# Patient Record
Sex: FEMALE | Race: WHITE | NOT HISPANIC OR LATINO
[De-identification: names, ages, dates, MRNs, and addresses within clinical notes are randomized per-mention and may not be internally consistent; named-entity substitution may affect disease eponyms.]

---

## 2021-04-27 ENCOUNTER — APPOINTMENT (OUTPATIENT)
Dept: DISASTER EMERGENCY | Facility: CLINIC | Age: 71
End: 2021-04-27

## 2021-04-27 DIAGNOSIS — Z01.818 ENCOUNTER FOR OTHER PREPROCEDURAL EXAMINATION: ICD-10-CM

## 2021-04-28 LAB — SARS-COV-2 N GENE NPH QL NAA+PROBE: NOT DETECTED

## 2021-04-30 ENCOUNTER — RESULT REVIEW (OUTPATIENT)
Age: 71
End: 2021-04-30

## 2021-04-30 ENCOUNTER — OUTPATIENT (OUTPATIENT)
Dept: OUTPATIENT SERVICES | Facility: HOSPITAL | Age: 71
LOS: 1 days | End: 2021-04-30
Payer: MEDICARE

## 2021-04-30 VITALS
HEIGHT: 60 IN | HEART RATE: 70 BPM | SYSTOLIC BLOOD PRESSURE: 131 MMHG | OXYGEN SATURATION: 97 % | DIASTOLIC BLOOD PRESSURE: 79 MMHG | WEIGHT: 156.09 LBS | RESPIRATION RATE: 20 BRPM | TEMPERATURE: 98 F

## 2021-04-30 VITALS
DIASTOLIC BLOOD PRESSURE: 63 MMHG | SYSTOLIC BLOOD PRESSURE: 110 MMHG | HEART RATE: 67 BPM | OXYGEN SATURATION: 95 % | RESPIRATION RATE: 16 BRPM

## 2021-04-30 DIAGNOSIS — Z98.890 OTHER SPECIFIED POSTPROCEDURAL STATES: Chronic | ICD-10-CM

## 2021-04-30 DIAGNOSIS — K86.2 CYST OF PANCREAS: ICD-10-CM

## 2021-04-30 PROCEDURE — 43242 EGD US FINE NEEDLE BX/ASPIR: CPT

## 2021-04-30 PROCEDURE — 43239 EGD BIOPSY SINGLE/MULTIPLE: CPT | Mod: XS

## 2021-04-30 PROCEDURE — 88305 TISSUE EXAM BY PATHOLOGIST: CPT

## 2021-04-30 PROCEDURE — 88173 CYTOPATH EVAL FNA REPORT: CPT

## 2021-04-30 PROCEDURE — 88305 TISSUE EXAM BY PATHOLOGIST: CPT | Mod: 26

## 2021-04-30 PROCEDURE — 82378 CARCINOEMBRYONIC ANTIGEN: CPT

## 2021-04-30 PROCEDURE — 88173 CYTOPATH EVAL FNA REPORT: CPT | Mod: 26

## 2021-04-30 RX ORDER — CIPROFLOXACIN LACTATE 400MG/40ML
VIAL (ML) INTRAVENOUS
Refills: 0 | Status: DISCONTINUED | OUTPATIENT
Start: 2021-04-30 | End: 2021-05-15

## 2021-04-30 RX ORDER — CIPROFLOXACIN LACTATE 400MG/40ML
400 VIAL (ML) INTRAVENOUS EVERY 12 HOURS
Refills: 0 | Status: DISCONTINUED | OUTPATIENT
Start: 2021-05-01 | End: 2021-05-15

## 2021-04-30 RX ORDER — SERTRALINE 25 MG/1
1 TABLET, FILM COATED ORAL
Qty: 0 | Refills: 0 | DISCHARGE

## 2021-04-30 RX ORDER — OMEPRAZOLE 10 MG/1
1 CAPSULE, DELAYED RELEASE ORAL
Qty: 0 | Refills: 0 | DISCHARGE

## 2021-04-30 RX ORDER — ATORVASTATIN CALCIUM 80 MG/1
1 TABLET, FILM COATED ORAL
Qty: 0 | Refills: 0 | DISCHARGE

## 2021-04-30 RX ORDER — CIPROFLOXACIN LACTATE 400MG/40ML
400 VIAL (ML) INTRAVENOUS ONCE
Refills: 0 | Status: DISCONTINUED | OUTPATIENT
Start: 2021-04-30 | End: 2021-05-15

## 2021-04-30 RX ORDER — SODIUM CHLORIDE 9 MG/ML
500 INJECTION INTRAMUSCULAR; INTRAVENOUS; SUBCUTANEOUS
Refills: 0 | Status: DISCONTINUED | OUTPATIENT
Start: 2021-04-30 | End: 2021-05-15

## 2021-04-30 NOTE — ASU PATIENT PROFILE, ADULT - PRO MENTAL HEALTH SX RECENT
----- Message from Zeferino Archer sent at 12/13/2017  8:46 AM EST -----  Regarding: Lolita Stew: [phone]  Mom called to provide an update.  Please advise [phone] none

## 2021-04-30 NOTE — PRE PROCEDURE NOTE - PRE PROCEDURE EVALUATION
Attending Physician:    Jose Luis Dwyer MD        Procedure: EUS    Indication for Procedure: pancreatic cyst  ________________________________________________________  PAST MEDICAL & SURGICAL HISTORY:  Hypercholesteremia    Gastric ulcer    Anxiety    Arthritis    H/O endoscopy    H/O colonoscopy      ALLERGIES:  Biaxin (Rash)    HOME MEDICATIONS:  atorvastatin 10 mg oral tablet: 1 tab(s) orally once a day  omeprazole 20 mg oral delayed release capsule: 1 cap(s) orally once a day  sertraline 100 mg oral tablet: 1 tab(s) orally once a day    AICD/PPM: [ ] yes   [ X] no    PERTINENT LAB DATA:                      PHYSICAL EXAMINATION:    Height (cm): 152.4  Weight (kg): 70.8  BMI (kg/m2): 30.5  BSA (m2): 1.68T(C): 36.7  HR: 70  BP: 131/79  RR: 20  SpO2: 97%    Constitutional: NAD  HEENT: PERRLA, EOMI,    Neck:  No JVD  Respiratory: CTAB/L  Cardiovascular: S1 and S2  Gastrointestinal: BS+, soft, NT/ND  Extremities: No peripheral edema  Neurological: A/O x 3, no focal deficits  Psychiatric: Normal mood, normal affect  Skin: No rashes    ASA Class: I [ ]  II [ X]  III [ ]  IV [ ]    COMMENTS:    The patient is a suitable candidate for the planned procedure unless box checked [ ]  No, explain:

## 2021-05-03 LAB
CEA FLD-MCNC: 6346 NG/ML — SIGNIFICANT CHANGE UP
NON-GYNECOLOGICAL CYTOLOGY STUDY: SIGNIFICANT CHANGE UP
SPECIMEN SOURCE FLD: SIGNIFICANT CHANGE UP

## 2021-05-04 LAB — SURGICAL PATHOLOGY STUDY: SIGNIFICANT CHANGE UP

## 2021-05-18 ENCOUNTER — TRANSCRIPTION ENCOUNTER (OUTPATIENT)
Age: 71
End: 2021-05-18

## 2025-07-10 ENCOUNTER — OFFICE (OUTPATIENT)
Dept: URBAN - METROPOLITAN AREA CLINIC 103 | Facility: CLINIC | Age: 75
Setting detail: OPHTHALMOLOGY
End: 2025-07-10
Payer: MEDICARE

## 2025-07-10 DIAGNOSIS — H25.13: ICD-10-CM

## 2025-07-10 DIAGNOSIS — H52.7: ICD-10-CM

## 2025-07-10 DIAGNOSIS — H43.393: ICD-10-CM

## 2025-07-10 DIAGNOSIS — H35.033: ICD-10-CM

## 2025-07-10 PROCEDURE — 92015 DETERMINE REFRACTIVE STATE: CPT | Performed by: OPHTHALMOLOGY

## 2025-07-10 PROCEDURE — 92004 COMPRE OPH EXAM NEW PT 1/>: CPT | Performed by: OPHTHALMOLOGY

## 2025-07-10 ASSESSMENT — REFRACTION_AUTOREFRACTION
OS_SPHERE: -1.50
OD_CYLINDER: SPH
OS_AXIS: 044
OS_CYLINDER: -0.50
OD_SPHERE: -0.25

## 2025-07-10 ASSESSMENT — REFRACTION_MANIFEST
OD_ADD: +2.00
OD_CYLINDER: SPHERE
OS_CYLINDER: SPHERE
OS_VA1: 20/20
OD_AXIS: 104
OD_VA2: 20/20
OS_ADD: +2.00
OD_CYLINDER: -0.25
OS_VA2: 20/20
OS_SPHERE: +1.25
OD_SPHERE: -0.50
OS_AXIS: 115
OS_ADD: +2.00
OD_SPHERE: +2.00
OS_CYLINDER: -0.75
OD_VA1: 20/20
OS_SPHERE: -1.25
OD_ADD: +2.00

## 2025-07-10 ASSESSMENT — REFRACTION_CURRENTRX
OD_OVR_VA: 20/
OD_CYLINDER: SPH
OS_SPHERE: +2.00
OD_SPHERE: +3.00
OS_OVR_VA: 20/
OS_CYLINDER: SPH

## 2025-07-10 ASSESSMENT — TONOMETRY
OS_IOP_MMHG: 17
OD_IOP_MMHG: 17

## 2025-07-10 ASSESSMENT — KERATOMETRY
OD_K2POWER_DIOPTERS: 42.25
OS_AXISANGLE_DEGREES: 085
OS_K2POWER_DIOPTERS: 42.75
OD_AXISANGLE_DEGREES: 102
OS_K1POWER_DIOPTERS: 42.00
OD_K1POWER_DIOPTERS: 41.75

## 2025-07-10 ASSESSMENT — VISUAL ACUITY
OD_BCVA: 20/30+1
OS_BCVA: 20/40+2